# Patient Record
Sex: MALE | Race: WHITE | NOT HISPANIC OR LATINO | URBAN - METROPOLITAN AREA
[De-identification: names, ages, dates, MRNs, and addresses within clinical notes are randomized per-mention and may not be internally consistent; named-entity substitution may affect disease eponyms.]

---

## 2019-11-01 ENCOUNTER — IMPORTED ENCOUNTER (OUTPATIENT)
Dept: URBAN - METROPOLITAN AREA CLINIC 31 | Facility: CLINIC | Age: 19
End: 2019-11-01

## 2019-11-01 PROCEDURE — 92004 COMPRE OPH EXAM NEW PT 1/>: CPT

## 2019-11-01 PROCEDURE — 92015 DETERMINE REFRACTIVE STATE: CPT

## 2021-09-01 ENCOUNTER — IMPORTED ENCOUNTER (OUTPATIENT)
Dept: URBAN - METROPOLITAN AREA CLINIC 31 | Facility: CLINIC | Age: 21
End: 2021-09-01

## 2021-09-01 PROBLEM — H47.213: Noted: 2021-09-01

## 2021-09-01 PROCEDURE — 92014 COMPRE OPH EXAM EST PT 1/>: CPT

## 2021-09-01 PROCEDURE — 92015 DETERMINE REFRACTIVE STATE: CPT

## 2021-09-01 NOTE — PATIENT DISCUSSION
1.  Mild Refractive error2. Astigmatism Annual Good ocular health documented. Discussed options of glasses contacts or refractive surgery. Discussed importance of annual eye exams. 2.  Optic Atrophy OU/OS  -- Alcohol amblyopia/tixicity? Neuro consult with Dr. Rachael Yen. Patient to get MRI results to bring with him. Need to check pupil no dilation before Dr. Rachael Yen sees him.

## 2021-09-01 NOTE — PATIENT DISCUSSION
Optic Atrophy OU  -- The patient has optic atrophy of both eyes which is the likely cause of his/her decreased vision. The condition was explained to the patient.

## 2021-09-03 ENCOUNTER — IMPORTED ENCOUNTER (OUTPATIENT)
Dept: URBAN - METROPOLITAN AREA CLINIC 31 | Facility: CLINIC | Age: 21
End: 2021-09-03

## 2021-09-03 PROBLEM — H47.213: Noted: 2021-09-03

## 2021-09-03 PROCEDURE — 99213 OFFICE O/P EST LOW 20 MIN: CPT

## 2021-09-03 NOTE — PATIENT DISCUSSION
Optic Atrophy OU  -- in a 24 WM alcoholic heavy smoker. S/p ICU admit at [facility] due to pancreatitis 6/21. Withdrawal and sedation during admit. Vision loss during admit. Blood flow deficient delivery of nutrients and oxygen reviewed w/ pt. Vitamin Deficiency's from alcohol consumption reviewed. Condition may or may not improve. Recommended having labs done. Also offered 2nd opinion if pt desires w/ a Neuro-OphthalmologistWILL TEST b1 b2 b6 G15 AND Folic acid Follow up on 3 weeks for office call. after labs done with Dr. Blanca Wang.

## 2021-09-17 ENCOUNTER — IMPORTED ENCOUNTER (OUTPATIENT)
Dept: URBAN - METROPOLITAN AREA CLINIC 31 | Facility: CLINIC | Age: 21
End: 2021-09-17

## 2021-09-17 PROBLEM — H47.213: Noted: 2021-09-17

## 2021-09-17 PROCEDURE — 92083 EXTENDED VISUAL FIELD XM: CPT

## 2021-09-17 PROCEDURE — 99213 OFFICE O/P EST LOW 20 MIN: CPT

## 2021-09-17 NOTE — PATIENT DISCUSSION
Optic Atrophy OU  -- in a 24 WM alcoholic heavy smoker. S/p ICU admit at [facility] due to pancreatitis 6/21. Withdrawal and sedation during admit. Vision loss during admit. Blood flow deficient delivery of nutrients and oxygen reviewed w/ pt. Vitamin Deficiency's from alcohol consumption reviewed. Condition may or may not improve. Recommended having labs done. Also offered 2nd opinion if pt desires w/ a Neuro-Ophthalmologist TESTED b1 b2 b6 B22 AND Folic acid - PENDINGVF TODAY Follow up on 3 weeks for office call. after labs done with Dr. Rhianna Shetty.

## 2021-11-19 ENCOUNTER — IMPORTED ENCOUNTER (OUTPATIENT)
Dept: URBAN - METROPOLITAN AREA CLINIC 31 | Facility: CLINIC | Age: 21
End: 2021-11-19

## 2021-11-19 PROBLEM — H47.213: Noted: 2021-11-19

## 2021-11-19 PROCEDURE — 99213 OFFICE O/P EST LOW 20 MIN: CPT

## 2021-11-19 NOTE — PATIENT DISCUSSION
Optic Atrophy OU  -- in a 24 WM alcoholic heavy smoker. S/p ICU admit at [facility] due to pancreatitis 6/21. Withdrawal and sedation during admit. Vision loss during admit. Blood flow deficient delivery of nutrients and oxygen reviewed w/ pt. Vitamin Deficiency's from alcohol consumption reviewed. Condition may or may not improve. Recommended having labs done. Also offered 2nd opinion if pt desires w/ a Neuro-Ophthalmologist TESTED b1 b2 b6 D59 AND Folic acid NO vision changes since - remains CF OUWill go for second opinion to [facility] for an appointment with Dr. Marcos Alston.  PRN

## 2022-04-02 ASSESSMENT — TONOMETRY
OD_IOP_MMHG: 16
OS_IOP_MMHG: 15
OS_IOP_MMHG: 16
OD_IOP_MMHG: 15

## 2022-04-02 ASSESSMENT — VISUAL ACUITY
OD_CC: 20/30-2
OD_PH: SC 20/400
OS_CC: 20/20-2
OS_SC: 20/400+2
OS_PH: SC 20/400
OD_SC: 20/400-2